# Patient Record
Sex: MALE | Race: WHITE
[De-identification: names, ages, dates, MRNs, and addresses within clinical notes are randomized per-mention and may not be internally consistent; named-entity substitution may affect disease eponyms.]

---

## 2019-12-26 NOTE — REP
CLINICAL:  Intermittent headaches

 

TECHNIQUE:   Axial contrast enhanced images from the skull base to the vertex

using angiographic technique with 100 ml Isovue 370 intravenous contrast

material.  Multiplanar re-formations and 3-D volume rendered angiogram of the

brain. COMPARISON: None  FINDINGS:   There are suggestions for atherosclerotic

disease involving the internal carotid arteries without significant stenosis.

There is symmetric appearance to the intracranial vasculature and [place], which appears relatively normal. The visualized portions of the

vertebrobasilar system appear intact. There is no evidence for arteriovenous

malformation, aneurysm, or evidence for areas of occlusion.  IMPRESSION:

Atherosclerotic changes. No evidence for aneurysm, AVM, or significant further

pathology. Intracranial vasculature appears relatively symmetric.

 

 

Electronically Signed by

Basilio Mccloud MD 12/26/2019 04:16 P

## 2019-12-26 NOTE — REP
Clinical:  Intermittent headaches .

 

Comparison: None .

 

Findings:

The ventricles, sulci, and cisterns are normal in position and appearance.

Gray-white differentiation is maintained.  No acute intracranial hemorrhage,

mass/mass effect, pathology or trauma/injury.  No evidence for acute infarction.

No extra-axial fluid collection.  Calvarium is intact.  Paranasal sinuses and

mastoid air cells are clear.

 

Impression:

Normal noncontrast head CT.

No evidence for acute intracranial pathology or trauma/injury.

 

 

Electronically Signed by

Basilio Mccloud MD 12/26/2019 04:17 P

## 2022-06-01 ENCOUNTER — HOSPITAL ENCOUNTER (OUTPATIENT)
Dept: HOSPITAL 53 - M LABSMTC | Age: 62
End: 2022-06-01
Attending: ANESTHESIOLOGY
Payer: COMMERCIAL

## 2022-06-01 DIAGNOSIS — Z11.52: Primary | ICD-10-CM

## 2022-06-01 DIAGNOSIS — Z20.822: ICD-10-CM

## 2022-06-03 ENCOUNTER — HOSPITAL ENCOUNTER (OUTPATIENT)
Dept: HOSPITAL 53 - M OPP | Age: 62
Discharge: HOME | End: 2022-06-03
Attending: INTERNAL MEDICINE
Payer: COMMERCIAL

## 2022-06-03 VITALS — DIASTOLIC BLOOD PRESSURE: 81 MMHG | SYSTOLIC BLOOD PRESSURE: 137 MMHG

## 2022-06-03 VITALS — HEIGHT: 71 IN | BODY MASS INDEX: 31.05 KG/M2 | WEIGHT: 221.8 LBS

## 2022-06-03 DIAGNOSIS — K64.8: ICD-10-CM

## 2022-06-03 DIAGNOSIS — Z12.11: Primary | ICD-10-CM

## 2022-06-03 DIAGNOSIS — K63.5: ICD-10-CM

## 2022-06-03 DIAGNOSIS — K57.30: ICD-10-CM

## 2022-06-03 DIAGNOSIS — Z80.8: ICD-10-CM
